# Patient Record
(demographics unavailable — no encounter records)

---

## 2024-12-13 NOTE — ASSESSMENT
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 Biopsy-proven AIH cirrhosis 5/15/23, Labs support diagnosis (elevated LFTs + DAWIT, elev IgG), viral serologies neg.  # AIH, no features of cirrhosis on liver bx done 5/15/23  - radiograph/ sonographies may show as cirrhosis as trichrome stain also highlights region of hepatocyte collapse.  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 Norris, however pt did not follow as he did not know he had a referral made-- ok to take from hep standpoint if needed. Will assist in arranging urology f/u via [facility].   # Shoulder pain improved, does not wish to pursue shoulder surgery at this time (advised he is optimized for shoulder surgery if needed) uses horse liniment  Plan  - Repeat labs this week (pt cannot get labs done today)  - US ABD prior to next RTO - Repeat labs prior to next RTO (will place orders)  - Will assist w/ urology f/u via [facility].  - RTO in 3 mo, will obtain fibroscan at time of next visit. Educated to fast x 4 hours prior to next study.

## 2024-12-13 NOTE — ASSESSMENT
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 Biopsy-proven AIH cirrhosis 5/15/23, Labs support diagnosis (elevated LFTs + DAWIT, elev IgG), viral serologies neg.  # AIH, no features of cirrhosis on liver bx done 5/15/23  - radiograph/ sonographies may show as cirrhosis as trichrome stain also highlights region of hepatocyte collapse.  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 Norris, however pt did not follow as he did not know he had a referral made-- ok to take from hep standpoint if needed. Will assist in arranging urology f/u via [facility].   # Shoulder pain improved, does not wish to pursue shoulder surgery at this time (advised he is optimized for shoulder surgery if needed) uses horse liniment  Plan  - Repeat labs this week (pt cannot get labs done today)  - US ABD prior to next RTO - Repeat labs prior to next RTO (will place orders)  - Will assist w/ urology f/u via [facility].  - RTO in 3 mo, will obtain fibroscan at time of next visit. Educated to fast x 4 hours prior to next study.

## 2024-12-13 NOTE — HISTORY OF PRESENT ILLNESS
[FreeTextEntry1] : 76 yo M w bx confirmed AIH 5/15/23, incidentally noted transaminitis on preop lab w/u. Additional pMHx emphysema, HTN, HLD, chronic R shoulder pain, small fat and fluid containing umbilical and midline supraumbilical hernias, treated with pred and AZA -- (pred weaned off 12/18/23), now presenting for 3month f/u.   Bx done 5/15/23 Addendum: Trichrome reveals mild to moderate periportal fibrosis.  Definitive bridging fibrosis is not identified.  There is no evidence of cirrhosis. Trichrome stain also highlights regions of hepatocyte collapse. Specimen(s) Submitted 1  Liver biopsy Final Diagnosis 1.  Liver, biopsy: -   Severe hepatitis -   Features of fibrosis not identified on H/E stains (findings from trichrome stains to be issued in an addendum) -   Mild increased liver deposits (1+ of 4) -   See note  Hx R shoulder injury after missing a step on ladder and falling off roof, surgery postponed until disease in control --> Non op plan for now with respect to ongoing R/L shoulder pain, On Aspirin and Tylenol  Interim hx - LOV 9/9/24  - Labs done 9/11/24: (on  mg)  AST 60 (54) ALT 32 (26)  (95) TB 0.7 (0.6) SCr 0,72 hg 13.5 PLT 87 AFP 9 (12.5) - Taking aza 50 mg TID (total daily dose 150 mg)  - Has much improved ROM in both upper extremities  - Previously reported midline supraumbilical hernia tenderness has improved; is reporting spontaneous reduction in size of hernia  - Denies n/v/d, pruritis, excessive fatigue, chest pain, SOB, palpitations, lightheadedness/ dizziness, melena, unintentional weight loss, acute hospitalizations or ER visits. Afebrile today. Appetite is good.  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 No focal lesion is seen. Hepatopetal blood flow in main portal vein. Bile ducts: Normal caliber. Common bile duct measures 3 mm. Gallbladder: Within normal limits. Pancreas: Visualized portions are within normal limits. Spleen: 12.3 cm. Within normal limits. Right kidney: 11.3 cm. No hydronephrosis. Left kidney: 11.3 cm. No hydronephrosis. 0.6 x 0.6 x 0.7 cm cyst upper pole. Ascites: None. Aorta and IVC: Visualized portions are within normal limits.  IMPRESSION: Cirrhotic liver. No liver mass identified.

## 2024-12-13 NOTE — END OF VISIT
[FreeTextEntry3] : I have seen and examined patient at bedside. I agree with hx, ROS, physical examination, imp/suggestions as written by Ms. Georgina NP. Please see my note. [Time Spent: ___ minutes] : I have spent [unfilled] minutes of time on the encounter which excludes teaching and separately reported services.

## 2024-12-13 NOTE — ASSESSMENT
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 Biopsy-proven AIH cirrhosis 5/15/23, Labs support diagnosis (elevated LFTs + DAWIT, elev IgG), viral serologies neg.  # AIH, no features of cirrhosis on liver bx done 5/15/23  - radiograph/ sonographies may show as cirrhosis as trichrome stain also highlights region of hepatocyte collapse.  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 Norris, however pt did not follow as he did not know he had a referral made-- ok to take from hep standpoint if needed. Will assist in arranging urology f/u via [facility].   # Shoulder pain improved, does not wish to pursue shoulder surgery at this time (advised he is optimized for shoulder surgery if needed) uses horse liniment  Plan  - Repeat labs this week (pt cannot get labs done today)  - US ABD prior to next RTO - Repeat labs prior to next RTO (will place orders)  - Will assist w/ urology f/u via [facility].  - RTO in 3 mo, will obtain fibroscan at time of next visit. Educated to fast x 4 hours prior to next study.

## 2024-12-13 NOTE — PHYSICAL EXAM
[General Appearance - Alert] : alert [General Appearance - In No Acute Distress] : in no acute distress [PERRL With Normal Accommodation] : pupils were equal in size, round, and reactive to light [] : no respiratory distress [Auscultation Breath Sounds / Voice Sounds] : lungs were clear to auscultation bilaterally [Abdomen Soft] : soft [Abnormal Walk] : normal gait [Nail Clubbing] : no clubbing  or cyanosis of the fingernails [Oriented To Time, Place, And Person] : oriented to person, place, and time [Respiration, Rhythm And Depth] : normal respiratory rhythm and effort [Heart Rate And Rhythm] : heart rate was normal and rhythm regular [Heart Sounds] : normal S1 and S2 [Edema] : there was no peripheral edema [Abdomen Tenderness] : non-tender [Impaired Insight] : insight and judgment were intact [Affect] : the affect was normal [No Focal Deficits] : no focal deficits [Scleral Icterus] : No Scleral Icterus [Spider Angioma] : No spider angioma(s) were observed [Asterixis] : no asterixis observed [Jaundice] : No jaundice [FreeTextEntry1] : followed by derm for skin lesions on chest  ALT 32 (26)  (95) TB 0.7 (0.6) SCr 0,72 hg 13.5 PLT 87  AFP 9 (12/5)

## 2025-01-02 NOTE — END OF VISIT
[FreeTextEntry3] : I have seen, examined and evaluated the patient and formulated a plan of care after the resident saw the patient. I have edited the note above to reflect my recommendations and any changes in the history and have written the patients assessment and plan.

## 2025-01-02 NOTE — HISTORY OF PRESENT ILLNESS
[FreeTextEntry1] : 76 y/o male presents for penile lesion, jock itch, ED and low testosterone by PCP  dark penile lesion is on the left side of the corona, first noticed 25 years ago, recurs 1 month out of the year, then "falls off or disappears" non-tender, non-pruritic  jock itch for many years, comes and goes. Saw derm several years ago who prescribed cream but minimal improvement. Has cream from dermatologist that he has not tried yet.  has tried Viagra and Cialis but headaches are bothersome. Not interested in ICI  reports daytime urinary frequency, urgency, nocturia 2x/night - not significantly bothersome and would like to hold off on medication for now  denies hematuria  has never seen a urologist previously  PMHx: autoimmune hepatitis, emphysema, HTN, HLD, umbilical hernia PSHx: denies Meds: metoprolol, losartan, aspirin 81, azathioprine. sildenafil 25  Total T (9/11/24) - 686 Free T 0.8  PSA: 4/2024 - 0.75 4/2022 - 0.72 3/2021 - 0.91

## 2025-01-02 NOTE — PHYSICAL EXAM
[General Appearance - In No Acute Distress] : no acute distress [] : no respiratory distress [Scrotum] : the scrotum was normal [Testes Tenderness] : no tenderness of the testes [Oriented To Time, Place, And Person] : oriented to person, place, and time [de-identified] : 1cm raised, dark lesion on left coronal sulcus, non tender, comedo-like

## 2025-01-02 NOTE — ASSESSMENT
[FreeTextEntry1] : ED  penile lesion will follow up with derm re penile lesion UA UCX trail levitra 20 consider NSAIDs prn headache  normal TT f/u 3-6 months

## 2025-03-10 NOTE — HISTORY OF PRESENT ILLNESS
[de-identified] : 76 yo M w bx confirmed AIH 5/15/23, incidentally noted transaminitis on preop lab w/u. Additional pMHx emphysema, HTN, HLD, chronic R shoulder pain, small fat and fluid containing umbilical and midline supraumbilical hernias, treated with pred and AZA -- (pred weaned off 12/18/23), now presenting for 3month f/u.  Bx done 5/15/23 Addendum: Trichrome reveals mild to moderate periportal fibrosis. Definitive bridging fibrosis is not identified. There is no evidence of cirrhosis. Trichrome stain also highlights regions of hepatocyte collapse. Specimen(s) Submitted 1 Liver biopsy Final Diagnosis 1. Liver, biopsy: - Severe hepatitis - Features of fibrosis not identified on H/E stains (findings from trichrome stains to be issued in an addendum) - Mild increased liver deposits (1+ of 4) - See note  Hx R shoulder injury after missing a step on ladder and falling off roof, surgery postponed until disease in control --> Non op plan for now with respect to ongoing R/L shoulder pain, On Aspirin and Tylenol  Interim hx - LOV 9/9/24 - Labs done 9/11/24: (on  mg) AST 60 (54) ALT 32 (26)  (95) TB 0.7 (0.6) SCr 0,72 hg 13.5 PLT 87 AFP 9 (12.5) - Taking aza 50 mg TID (total daily dose 150 mg) - Has much improved ROM in both upper extremities - Previously reported midline supraumbilical hernia tenderness has improved; is reporting spontaneous reduction in size of hernia - Denies n/v/d, pruritis, excessive fatigue, chest pain, SOB, palpitations, lightheadedness/ dizziness, melena, unintentional weight loss, acute hospitalizations or ER visits. Afebrile today. Appetite is good. shoulder much improved and getting some strength back  MEDICATIONS rec done 12/9/24 Baby Aspirin Metoprolol 50 daily losartan 25 mg lorazepam 0.5 mg --> no longer taking Famotidine 40mg daily AZA 50 mg TID  SOCIAL HX former actor, [employer] Straight White Men, [employer], [employer] Heavy drinking in the past, stopped about 30 years ago IVDA, stopped 30 years ago former smoker, smoked many years ago but occasionally smokes a cigar covid vac x 2 hx covid infection 2 yrs ago, mild  FAMILY HX No family history of liver or GI cancer Father, 3 vessel CAD, HTN  SURGICAL HX T& A age 12  STUDIES  Abd US 7/9/2024 FINDINGS: Liver: Small liver with coarse parenchyma and nodular contour, consistent with cirrhosis. No focal lesion is seen. Hepatopetal blood flow in main portal vein. Bile ducts: Normal caliber. Common bile duct measures 3 mm. Gallbladder: Within normal limits. Pancreas: Visualized portions are within normal limits. Spleen: 12.3 cm. Within normal limits. Right kidney: 11.3 cm. No hydronephrosis. Left kidney: 11.3 cm. No hydronephrosis. 0.6 x 0.6 x 0.7 cm cyst upper pole. Ascites: None. Aorta and IVC: Visualized portions are within normal limits.  IMPRESSION: Cirrhotic liver. No liver mass identified.   Sonogram EXAM: [id number] - US ABDOMEN COMPLETE  - ORDERED BY: AIMEE MATUTE   PROCEDURE DATE:  03/04/2025    INTERPRETATION:  CLINICAL INFORMATION: Elevated liver enzymes.  COMPARISON: Ultrasound 7/9/2024. MRI 10/31/2023. TECHNIQUE: Sonography of the abdomen.  FINDINGS: Liver: Coarse hepatic echogenicity with nodular contour. No hepatic mass identified. Patent hepatic vasculature. Bile ducts: Normal caliber. Common bile duct measures 6 mm. Gallbladder: Within normal limits. Pancreas: Visualized portions are within normal limits. Spleen: 13.5 cm. Within normal limits. Right kidney: 12.0 cm. No hydronephrosis. Left kidney: 11.3 cm. 1 cm simple cyst No hydronephrosis. Ascites: None. Aorta and IVC: Visualized portions are within normal limits.  IMPRESSION: Cirrhotic liver. No liver mass identified.   [FreeTextEntry1] : Interval Hx 3/10/2025  He is here for follow-up today no new complaint no nausea vomiting or diarrhea his musculoskeletal symptoms are the same no confusion no chest pain shortness of breath or palpitation review of systems otherwise unremarkable  Vital signs stable Clear lungs Regular heartbeats with no murmur Soft abdomen with no tenderness or ascites No edema Awake alert and oriented

## 2025-03-10 NOTE — ASSESSMENT
[FreeTextEntry1] : Patient with autoimmune hepatitis and imaging suggestive of cirrhosis.  However, biopsy did not show cirrhosis.  His liver enzymes have improved.  He is on azathioprine 150 mg once a day. We performed a FibroScan today which showed a fibrosis score of 16.  This is a significant improvement from his baseline of 40. Patient asked if we can lower the dose of medications and see if he can tolerate less immunosuppressive drugs. Lower azathioprine to 100 mg once a day. Follow-up in 2 to 3 months with labs Risks associated with lowering the dose of immunosuppressive's were discussed with him in details

## 2025-06-09 NOTE — ASSESSMENT
[FreeTextEntry1] : Patient with autoimmune hepatitis and imaging suggestive of cirrhosis. However, biopsy did not show cirrhosis.   #history of autoimmune hepatitis - Mild elevation in AST and ALT after decreased dose of azathiorpine from 150mg q24 hours to 100mg q24 hours. Feels similar and without liver related complaints - Will continue to monitor for now - Continue azathioprine 100mg q24 hours - Will plan to repeat CMP in 3 months - FibroScan 3/2025 showed a fibrosis score of 16. This is a significant improvement from his baseline of 40.  Follow up labs in 3 months and in office visit in 6 months  Chavo Sánchez DO Gastroenterology [facility]

## 2025-06-09 NOTE — HISTORY OF PRESENT ILLNESS
[FreeTextEntry1] : 76 yo M w bx confirmed AIH 5/15/23, incidentally noted transaminitis on preop lab w/u. Additional pMHx emphysema, HTN, HLD, chronic R shoulder pain, small fat and fluid containing umbilical and midline supraumbilical hernias, treated with pred and AZA -- (pred weaned off 12/18/23), now presenting for 3month f/u.  Bx done 5/15/23 Addendum: Trichrome reveals mild to moderate periportal fibrosis. Definitive bridging fibrosis is not identified. There is no evidence of cirrhosis. Trichrome stain also highlights regions of hepatocyte collapse. Specimen(s) Submitted 1 Liver biopsy Final Diagnosis 1. Liver, biopsy: - Severe hepatitis - Features of fibrosis not identified on H/E stains (findings from trichrome stains to be issued in an addendum) - Mild increased liver deposits (1+ of 4) - See note  Hx R shoulder injury after missing a step on ladder and falling off roof, surgery postponed until disease in control --> Non op plan for now with respect to ongoing R/L shoulder pain, On Aspirin and Tylenol  Interim hx - LOV 9/9/24 - Labs done 9/11/24: (on  mg) AST 60 (54) ALT 32 (26)  (95) TB 0.7 (0.6) SCr 0,72 hg 13.5 PLT 87 AFP 9 (12.5) - Taking aza 50 mg TID (total daily dose 150 mg) - Has much improved ROM in both upper extremities - Previously reported midline supraumbilical hernia tenderness has improved; is reporting spontaneous reduction in size of hernia - Denies n/v/d, pruritis, excessive fatigue, chest pain, SOB, palpitations, lightheadedness/ dizziness, melena, unintentional weight loss, acute hospitalizations or ER visits. Afebrile today. Appetite is good. shoulder much improved and getting some strength back  MEDICATIONS rec done 12/9/24 Baby Aspirin Metoprolol 50 daily losartan 25 mg lorazepam 0.5 mg --> no longer taking Famotidine 40mg daily AZA 50 mg TID  SOCIAL HX former actor, [employer] Straight White Men, [employer], [employer] Heavy drinking in the past, stopped about 30 years ago IVDA, stopped 30 years ago former smoker, smoked many years ago but occasionally smokes a cigar covid vac x 2 hx covid infection 2 yrs ago, mild  FAMILY HX No family history of liver or GI cancer Father, 3 vessel CAD, HTN  SURGICAL HX T& A age 12  STUDIES  Abd US 7/9/2024 FINDINGS: Liver: Small liver with coarse parenchyma and nodular contour, consistent with cirrhosis. No focal lesion is seen. Hepatopetal blood flow in main portal vein. Bile ducts: Normal caliber. Common bile duct measures 3 mm. Gallbladder: Within normal limits. Pancreas: Visualized portions are within normal limits. Spleen: 12.3 cm. Within normal limits. Right kidney: 11.3 cm. No hydronephrosis. Left kidney: 11.3 cm. No hydronephrosis. 0.6 x 0.6 x 0.7 cm cyst upper pole. Ascites: None. Aorta and IVC: Visualized portions are within normal limits.  IMPRESSION: Cirrhotic liver. No liver mass identified.   Sonogram EXAM: [id number] - US ABDOMEN COMPLETE - ORDERED BY: AIMEE MATUTE   PROCEDURE DATE: 03/04/2025    INTERPRETATION: CLINICAL INFORMATION: Elevated liver enzymes.  COMPARISON: Ultrasound 7/9/2024. MRI 10/31/2023. TECHNIQUE: Sonography of the abdomen.  FINDINGS: Liver: Coarse hepatic echogenicity with nodular contour. No hepatic mass identified. Patent hepatic vasculature. Bile ducts: Normal caliber. Common bile duct measures 6 mm. Gallbladder: Within normal limits. Pancreas: Visualized portions are within normal limits. Spleen: 13.5 cm. Within normal limits. Right kidney: 12.0 cm. No hydronephrosis. Left kidney: 11.3 cm. 1 cm simple cyst No hydronephrosis. Ascites: None. Aorta and IVC: Visualized portions are within normal limits.  IMPRESSION: Cirrhotic liver. No liver mass identified.    Interval Hx 3/10/2025  He is here for follow-up today no new complaint no nausea vomiting or diarrhea his musculoskeletal symptoms are the same no confusion no chest pain shortness of breath or palpitation review of systems otherwise unremarkable  Vital signs stable Clear lungs Regular heartbeats with no murmur Soft abdomen with no tenderness or ascites No edema Awake alert and oriented  6/9/25 After 3/2025 evaluation, azathioprine dose decreased from 150mg q24 hours to 100mg q24 hours. Patient describes feeling the same as prior to decreased dose. States he has ongoing fatigue but generally feels well. Denies abdominal pain, nausea, vomiting, diarrhea, and constipation. Describes noticing circular small healing rash on bilateral medial aspect of ankles. Feels as if has abdominal wall hernia that is easily reducible. Asks about medication he can safely take for diffuse chronic arthritic joint pain.

## 2025-06-09 NOTE — PHYSICAL EXAM
[Sclera] : the sclera and conjunctiva were normal [PERRL With Normal Accommodation] : pupils were equal in size, round, and reactive to light [Extraocular Movements] : extraocular movements were intact [Outer Ear] : the ears and nose were normal in appearance [Oropharynx] : the oropharynx was normal [Neck Appearance] : the appearance of the neck was normal [Neck Cervical Mass (___cm)] : no neck mass was observed [Jugular Venous Distention Increased] : there was no jugular-venous distention [Thyroid Diffuse Enlargement] : the thyroid was not enlarged [Thyroid Nodule] : there were no palpable thyroid nodules [Auscultation Breath Sounds / Voice Sounds] : lungs were clear to auscultation bilaterally [Heart Rate And Rhythm] : heart rate was normal and rhythm regular [Heart Sounds] : normal S1 and S2 [Heart Sounds Gallop] : no gallops [Murmurs] : no murmurs [Heart Sounds Pericardial Friction Rub] : no pericardial rub [Bowel Sounds] : normal bowel sounds [Abdomen Soft] : soft [Abdomen Tenderness] : non-tender [] : no hepato-splenomegaly [FreeTextEntry1] : Palpapbe small abdominal wall defect, possible reduced hernia sac, slightly superior to umbilicus [Abdomen Mass (___ Cm)] : no abdominal mass palpated